# Patient Record
Sex: FEMALE | URBAN - METROPOLITAN AREA
[De-identification: names, ages, dates, MRNs, and addresses within clinical notes are randomized per-mention and may not be internally consistent; named-entity substitution may affect disease eponyms.]

---

## 2022-08-22 ENCOUNTER — CLINICAL DOCUMENTATION (OUTPATIENT)
Dept: PHARMACY | Facility: CLINIC | Age: 42
End: 2022-08-22

## 2022-08-22 NOTE — PROGRESS NOTES
**New EMR created**    Pharmacy Pop Care Documentation:   Patient is missing the following requirements: DX: DM Type One or Type Two;  Provider Documentation of DM Visit; A1C. If completed by 8/25/22 patient will be eligible for enrollment in the DM Program on 9/01/22. Application Received: 6/96/03  Application scanned. Following e-mail sent to patient:    Good Morning Fahad,    Thanks so much for taking the first step towards better health. This e-mail is to inform you that we have received your enrollment form for the [address] [place] [address] Well With Diabetes Program, but you are missing the following requirements or documentation:     Diagnosis of Diabetes Type One or Two - as per documentation from your Provider  Provider Visit for DM within the last 12 months  A1C Result within the last 12 months     To qualify for this program the above requirements must be met by 8/25/21 for enrollment into the program on 9/01/22. Results or visits obtained outside of [address] [place] [address] will need to be provided by fax: [phone] or email: [email or web address] before the deadline in order to qualify for the program.     If requirements are not met by the date listed above, you will be not be enrolled in the program.       [address]  Phone: toll free [phone] Option #3  Email: [email or web address]  [email or web address] Number: [phone]      Mulu Jane, [address]   Department, toll free: [phone] Option #3